# Patient Record
Sex: FEMALE | Race: BLACK OR AFRICAN AMERICAN | NOT HISPANIC OR LATINO | Employment: FULL TIME | URBAN - METROPOLITAN AREA
[De-identification: names, ages, dates, MRNs, and addresses within clinical notes are randomized per-mention and may not be internally consistent; named-entity substitution may affect disease eponyms.]

---

## 2024-07-11 ENCOUNTER — OFFICE VISIT (OUTPATIENT)
Dept: URGENT CARE | Facility: CLINIC | Age: 34
End: 2024-07-11
Payer: OTHER MISCELLANEOUS

## 2024-07-11 VITALS
WEIGHT: 160 LBS | SYSTOLIC BLOOD PRESSURE: 139 MMHG | HEIGHT: 64 IN | RESPIRATION RATE: 18 BRPM | BODY MASS INDEX: 27.31 KG/M2 | TEMPERATURE: 98 F | HEART RATE: 66 BPM | OXYGEN SATURATION: 98 % | DIASTOLIC BLOOD PRESSURE: 80 MMHG

## 2024-07-11 DIAGNOSIS — Z02.6 ENCOUNTER RELATED TO WORKER'S COMPENSATION CLAIM: Primary | ICD-10-CM

## 2024-07-11 DIAGNOSIS — J45.909 BRONCHITIS, ALLERGIC, UNSPECIFIED ASTHMA SEVERITY, UNCOMPLICATED: ICD-10-CM

## 2024-07-11 RX ORDER — PREDNISONE 20 MG/1
TABLET ORAL
Qty: 6 TABLET | Refills: 0 | Status: SHIPPED | OUTPATIENT
Start: 2024-07-11

## 2024-07-11 RX ORDER — ALBUTEROL SULFATE 90 UG/1
2 AEROSOL, METERED RESPIRATORY (INHALATION) EVERY 6 HOURS PRN
Qty: 18 G | Refills: 0 | Status: SHIPPED | OUTPATIENT
Start: 2024-07-11 | End: 2024-07-16

## 2024-07-11 NOTE — LETTER
[facility]  [address] [facility], [address]  [place] [address]  Phone: [phone]  Fax: [phone]  [facility]: [phone]    Pt Name: Noni Bean  Injury Date: 07/04/2024   Employee ID: [id number] Date of First Treatment: 07/11/2024   Company: [employer] to [id number]      Appointment Time: 11:55 AM Arrived: 12:15 pm   Provider: Rickie Jarvis MD Time Out: 1:55 pm     Office Treatment:   1. Encounter related to worker's compensation claim    2. Bronchitis, allergic, unspecified asthma severity, uncomplicated      Medications Ordered This Encounter   Medications    albuterol (VENTOLIN HFA) 90 mcg/actuation inhaler    predniSONE (DELTASONE) 20 MG tablet           Restrictions: Regular Duty, Discharged from Occupational Health     Return Appointment: None.  Follow up if symptoms worsen or fail to improve.   KENISHA

## 2024-07-11 NOTE — PROGRESS NOTES
Subjective:      Patient ID: Noni Bean is a 34 y.o. female.    Chief Complaint: chemical exposure (DOI 07/05/24  Shortness of breath, cough and heaviness to chest )    Patient's place of employment - [employer]  Patient's job title - Care Giver  Date of injury - 07/05/24  Body part injured including left or right - chest  Injury Mechanism - extremely strong cat urine  What they were doing when they got hurt - Went to care for for client upon after I was at the clients home about 4 hours when my breathing became difficulty there was a burning sensation and heaviness to my chest   What they did immediately after - completed the 12 hour shift I was working and notified my manager   Pain scale right now - 0    And states that about 6 days ago she went to a client's house that was 100 years old and had several cats and strong smell of cat urine and that she began to have some coughing and wheezing and tightness in her chest.  She does not have any wheezing in her breathing effort is normal however states that she has burning in her nose and chest from the odor of the CT urine.  She will not be going back to that house.  Physical exam is normal, pulse ox normal, will allow to work regular duty without restrictions with the prescriptions of prednisone for 3 days, albuterol inhaler if needed and over-the-counter antihistamine.  Possible allergic bronchitis with normal exam at this time    Other  This is a new problem. The current episode started in the past 7 days. The problem occurs constantly. The problem has been gradually worsening. Associated symptoms include congestion and coughing. Pertinent negatives include no abdominal pain, anorexia, arthralgias, change in bowel habit, chest pain, chills, diaphoresis, fatigue, fever, headaches, joint swelling, myalgias, nausea, neck pain, numbness, rash, sore throat, swollen glands, urinary symptoms, vertigo, visual change, vomiting or weakness. Associated symptoms  comments: Heaviness to chest , difficulty breathing upon exertion and when coughing. The symptoms are aggravated by coughing, exertion and walking (inclosed spaces and aniexty). She has tried position changes, lying down and rest for the symptoms. The treatment provided no relief.     ROS  Constitution: Negative for chills, sweating, fatigue and fever.   HENT:  Positive for congestion. Negative for ear pain, sinus pain and sore throat.    Neck: Negative for neck pain and neck stiffness.   Cardiovascular:  Negative for chest pain, palpitations and sob on exertion.   Eyes:  Negative for eye pain and vision loss.   Respiratory:  Positive for cough. Negative for shortness of breath and asthma.    Gastrointestinal:  Negative for abdominal pain, nausea, vomiting and diarrhea.   Genitourinary:  Negative for dysuria, frequency and hematuria.   Musculoskeletal:  Negative for pain, joint pain, joint swelling, abnormal ROM of joint, back pain and muscle ache.   Skin:  Negative for rash and wound.   Allergic/Immunologic: Negative for seasonal allergies and asthma.   Neurological:  Negative for dizziness, history of vertigo, light-headedness, headaches, altered mental status and numbness.   Psychiatric/Behavioral:  Negative for altered mental status and confusion.      Objective:     Physical Exam  Vitals and nursing note reviewed.   Constitutional:       Appearance: She is well-developed.   HENT:      Head: Normocephalic and atraumatic.      Right Ear: External ear normal.      Left Ear: External ear normal.      Nose: Nose normal. No congestion or rhinorrhea.   Eyes:      Conjunctiva/sclera: Conjunctivae normal.   Cardiovascular:      Rate and Rhythm: Normal rate and regular rhythm.   Pulmonary:      Effort: Pulmonary effort is normal.      Breath sounds: Normal breath sounds. No wheezing.      Comments: NO INCREASED WORK OF BREATHING, NORMAL RESPIRATORY EFFORT, PULSE OX NORMAL AND NO WHEEZING AND NO SIGNIFICANT COUGH  NOTED.  Abdominal:      General: Bowel sounds are normal.      Palpations: Abdomen is soft.   Musculoskeletal:      Cervical back: Normal range of motion and neck supple.   Lymphadenopathy:      Cervical: No cervical adenopathy.   Neurological:      Mental Status: She is alert and oriented to person, place, and time.   Psychiatric:         Behavior: Behavior normal.         Assessment:      1. Encounter related to worker's compensation claim    2. Bronchitis, allergic, unspecified asthma severity, uncomplicated      Plan:       Medications Ordered This Encounter   Medications    albuterol (VENTOLIN HFA) 90 mcg/actuation inhaler     Sig: Inhale 2 puffs into the lungs every 6 (six) hours as needed for Wheezing. Rescue     Dispense:  18 g     Refill:  0    predniSONE (DELTASONE) 20 MG tablet     Sig: Take 2 tablets daily for 3 days     Dispense:  6 tablet     Refill:  0         Restrictions: Regular Duty, Discharged from Occupational Health  Follow up if symptoms worsen or fail to improve.